# Patient Record
Sex: FEMALE | Race: WHITE
[De-identification: names, ages, dates, MRNs, and addresses within clinical notes are randomized per-mention and may not be internally consistent; named-entity substitution may affect disease eponyms.]

---

## 2020-04-19 ENCOUNTER — HOSPITAL ENCOUNTER (EMERGENCY)
Dept: HOSPITAL 26 - MED | Age: 40
Discharge: HOME | End: 2020-04-19
Payer: SELF-PAY

## 2020-04-19 VITALS — HEIGHT: 62 IN | BODY MASS INDEX: 32.02 KG/M2 | WEIGHT: 174 LBS

## 2020-04-19 VITALS — DIASTOLIC BLOOD PRESSURE: 62 MMHG | SYSTOLIC BLOOD PRESSURE: 116 MMHG

## 2020-04-19 VITALS — DIASTOLIC BLOOD PRESSURE: 66 MMHG | SYSTOLIC BLOOD PRESSURE: 116 MMHG

## 2020-04-19 DIAGNOSIS — O03.9: Primary | ICD-10-CM

## 2020-04-19 DIAGNOSIS — Z98.890: ICD-10-CM

## 2020-04-19 DIAGNOSIS — J45.909: ICD-10-CM

## 2020-04-19 LAB
APPEARANCE UR: CLEAR
BASOPHILS # BLD AUTO: 0 K/UL (ref 0–0.22)
BASOPHILS NFR BLD AUTO: 0.5 % (ref 0–2)
BILIRUB UR QL STRIP: NEGATIVE
COLOR UR: YELLOW
EOSINOPHIL # BLD AUTO: 0.2 K/UL (ref 0–0.4)
EOSINOPHIL NFR BLD AUTO: 1.9 % (ref 0–4)
ERYTHROCYTE [DISTWIDTH] IN BLOOD BY AUTOMATED COUNT: 17.4 % (ref 11.6–13.7)
GLUCOSE UR STRIP-MCNC: NEGATIVE MG/DL
HCT VFR BLD AUTO: 36.2 % (ref 36–48)
HGB BLD-MCNC: 11.8 G/DL (ref 12–16)
HGB UR QL STRIP: (no result)
LEUKOCYTE ESTERASE UR QL STRIP: NEGATIVE
LYMPHOCYTES # BLD AUTO: 1.6 K/UL (ref 2.5–16.5)
LYMPHOCYTES NFR BLD AUTO: 19.8 % (ref 20.5–51.1)
MCH RBC QN AUTO: 25 PG (ref 27–31)
MCHC RBC AUTO-ENTMCNC: 33 G/DL (ref 33–37)
MCV RBC AUTO: 76.9 FL (ref 80–94)
MONOCYTES # BLD AUTO: 0.6 K/UL (ref 0.8–1)
MONOCYTES NFR BLD AUTO: 7.6 % (ref 1.7–9.3)
NEUTROPHILS # BLD AUTO: 5.5 K/UL (ref 1.8–7.7)
NEUTROPHILS NFR BLD AUTO: 70.2 % (ref 42.2–75.2)
NITRITE UR QL STRIP: NEGATIVE
PH UR STRIP: 5.5 [PH] (ref 5–9)
PLATELET # BLD AUTO: 271 K/UL (ref 140–450)
RBC # BLD AUTO: 4.71 MIL/UL (ref 4.2–5.4)
RBC #/AREA URNS HPF: (no result) /HPF (ref 0–5)
WBC # BLD AUTO: 7.8 K/UL (ref 4.8–10.8)
WBC,URINE: (no result) /HPF (ref 0–5)

## 2020-04-19 PROCEDURE — 81001 URINALYSIS AUTO W/SCOPE: CPT

## 2020-04-19 PROCEDURE — 84702 CHORIONIC GONADOTROPIN TEST: CPT

## 2020-04-19 PROCEDURE — 76817 TRANSVAGINAL US OBSTETRIC: CPT

## 2020-04-19 PROCEDURE — 86901 BLOOD TYPING SEROLOGIC RH(D): CPT

## 2020-04-19 PROCEDURE — 36415 COLL VENOUS BLD VENIPUNCTURE: CPT

## 2020-04-19 PROCEDURE — 86900 BLOOD TYPING SEROLOGIC ABO: CPT

## 2020-04-19 PROCEDURE — 99284 EMERGENCY DEPT VISIT MOD MDM: CPT

## 2020-04-19 PROCEDURE — 85025 COMPLETE CBC W/AUTO DIFF WBC: CPT

## 2020-04-19 NOTE — NUR
Patient discharged with v/s stable. Written and verbal after care instructions 
given and explained. 

Patient alert, oriented and verbalized understanding of instructions. 
Ambulatory with steady gait. All questions addressed prior to discharge. ID 
band removed. Patient advised to follow up with PMD.NO Rx given. Patient 
educated on indication of medication including possible reaction and side 
effects. Opportunity to ask questions provided and answered.

## 2020-04-19 NOTE — NUR
38 Y/O FEMALE PRESENTS WITH VAGINAL BLEEDING STARTING YESTERDAY. PT IS 12 WEEKS 
PREGNANT. PT STATES SHE BEGAN SPOTTING YESTERDAY, PINK/RED BLOOD, HAS NOT BEEN 
SATURATING [place] PADS. PT STATES SHE HAD INTERCOURSE 3 DAYS PRIOR. PT STATES 
SHES HAD MODERATE ALCOHOL AND MARIJUANA USE IN BEGINNING OF PREGNANCY BUT HAS 
NOT RECENTLY BEEN USING. PT IS  WITH HX OF , STILL BORN, AND 
MISCARRIAGE. PT DENIES ANY PAIN, URINARY BURING, FREQUENCY. RESP EVEN AND 
UNLABORED. AAOX4. CAP REFILL <3. MUCOUS MEMBRANES PINK/MOIST

NKA

## 2020-04-20 ENCOUNTER — HOSPITAL ENCOUNTER (EMERGENCY)
Dept: HOSPITAL 26 - MED | Age: 40
Discharge: HOME | End: 2020-04-20
Payer: SELF-PAY

## 2020-04-20 VITALS — SYSTOLIC BLOOD PRESSURE: 108 MMHG | DIASTOLIC BLOOD PRESSURE: 64 MMHG

## 2020-04-20 VITALS — DIASTOLIC BLOOD PRESSURE: 50 MMHG | SYSTOLIC BLOOD PRESSURE: 98 MMHG

## 2020-04-20 VITALS — BODY MASS INDEX: 32.02 KG/M2 | HEIGHT: 62 IN | WEIGHT: 174 LBS

## 2020-04-20 DIAGNOSIS — J45.909: ICD-10-CM

## 2020-04-20 DIAGNOSIS — O03.9: Primary | ICD-10-CM

## 2020-04-20 LAB
ANION GAP SERPL CALCULATED.3IONS-SCNC: 15 MMOL/L (ref 8–16)
BASOPHILS # BLD AUTO: 0.1 K/UL (ref 0–0.22)
BASOPHILS NFR BLD AUTO: 0.7 % (ref 0–2)
BUN SERPL-MCNC: 12 MG/DL (ref 7–18)
CHLORIDE SERPL-SCNC: 105 MMOL/L (ref 98–107)
CO2 SERPL-SCNC: 24.3 MMOL/L (ref 21–32)
CREAT SERPL-MCNC: 0.9 MG/DL (ref 0.6–1.3)
EOSINOPHIL # BLD AUTO: 0.1 K/UL (ref 0–0.4)
EOSINOPHIL NFR BLD AUTO: 1 % (ref 0–4)
ERYTHROCYTE [DISTWIDTH] IN BLOOD BY AUTOMATED COUNT: 17.5 % (ref 11.6–13.7)
GFR SERPL CREATININE-BSD FRML MDRD: 90 ML/MIN (ref 90–?)
GLUCOSE SERPL-MCNC: 144 MG/DL (ref 74–106)
HCT VFR BLD AUTO: 34.4 % (ref 36–48)
HGB BLD-MCNC: 11.1 G/DL (ref 12–16)
LYMPHOCYTES # BLD AUTO: 2.3 K/UL (ref 2.5–16.5)
LYMPHOCYTES NFR BLD AUTO: 16.5 % (ref 20.5–51.1)
MCH RBC QN AUTO: 25 PG (ref 27–31)
MCHC RBC AUTO-ENTMCNC: 32 G/DL (ref 33–37)
MCV RBC AUTO: 77.4 FL (ref 80–94)
MONOCYTES # BLD AUTO: 0.7 K/UL (ref 0.8–1)
MONOCYTES NFR BLD AUTO: 5.1 % (ref 1.7–9.3)
NEUTROPHILS # BLD AUTO: 10.6 K/UL (ref 1.8–7.7)
NEUTROPHILS NFR BLD AUTO: 76.7 % (ref 42.2–75.2)
PLATELET # BLD AUTO: 236 K/UL (ref 140–450)
POTASSIUM SERPL-SCNC: 3.3 MMOL/L (ref 3.5–5.1)
RBC # BLD AUTO: 4.44 MIL/UL (ref 4.2–5.4)
SODIUM SERPL-SCNC: 141 MMOL/L (ref 136–145)
WBC # BLD AUTO: 13.9 K/UL (ref 4.8–10.8)

## 2020-04-20 PROCEDURE — 80048 BASIC METABOLIC PNL TOTAL CA: CPT

## 2020-04-20 PROCEDURE — 96361 HYDRATE IV INFUSION ADD-ON: CPT

## 2020-04-20 PROCEDURE — 99284 EMERGENCY DEPT VISIT MOD MDM: CPT

## 2020-04-20 PROCEDURE — 84702 CHORIONIC GONADOTROPIN TEST: CPT

## 2020-04-20 PROCEDURE — 96374 THER/PROPH/DIAG INJ IV PUSH: CPT

## 2020-04-20 PROCEDURE — 96375 TX/PRO/DX INJ NEW DRUG ADDON: CPT

## 2020-04-20 PROCEDURE — 85025 COMPLETE CBC W/AUTO DIFF WBC: CPT

## 2020-04-20 PROCEDURE — 36415 COLL VENOUS BLD VENIPUNCTURE: CPT

## 2020-04-20 NOTE — NUR
Patient discharged with v/s stable. Written and verbal after care instructions 
given and explained. 

Patient alert, oriented and verbalized understanding of instructions. 
Ambulatory with steady gait. All questions addressed prior to discharge. ID 
band removed. Patient advised to follow up with PMD. Rx of norco, narcan, and 
zofran given. Patient educated on indication of medication including possible 
reaction and side effects. Opportunity to ask questions provided and answered.

## 2020-04-20 NOTE — NUR
PT BIB SELF FOR C/O LOWER ABD PAIN AND VAGINAL BLEEDING. PT STATES SHE HAS 
HEAVY CLOTS, 10/10 PELVIC PAIN. PT HAS C/O WEAKNESS, N/V, DIZZINESS. PT TOOK 
IBUPROFEN 800MG AT HOME X 3 HOURS AGO WITH INEFFECTIVE RESULTS. NEGATIVE COVID 
SCREEN. PT WEARING MASK. RADIAL PULSES BILAT ARE WEAK AND THREADY. CAP REFILL < 
3. PT APPEARANCE IS PALE, DIAPHORETIC. VSS. HEART SOUNDS S1S2 PRESENT. LUNG 
SOUNDS CLEAR ALL THROUGHOUT. WHEELCHAIR ASSIST. PT STATES FEELING WEAK. ABD IR 
ROUND, SOFT, TENDERNESS ON LOWER QAUDS, ACTIVE BS. SPO2 99% RA. SIDE RAILS UP 
X2. BED IN LOWEST POSITION.



NKA.



PMH: ASTHMA.